# Patient Record
Sex: FEMALE | Race: WHITE | NOT HISPANIC OR LATINO | Employment: STUDENT | URBAN - METROPOLITAN AREA
[De-identification: names, ages, dates, MRNs, and addresses within clinical notes are randomized per-mention and may not be internally consistent; named-entity substitution may affect disease eponyms.]

---
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
## 2021-02-01 ENCOUNTER — OFFICE VISIT (OUTPATIENT)
Dept: ORTHOPEDICS | Facility: CLINIC | Age: 21
End: 2021-02-01
Payer: COMMERCIAL

## 2021-02-01 DIAGNOSIS — M67.431 GANGLION, RIGHT WRIST: Primary | ICD-10-CM

## 2021-02-01 DIAGNOSIS — Z01.818 PREOP TESTING: Primary | ICD-10-CM

## 2021-02-01 PROCEDURE — 99203 OFFICE O/P NEW LOW 30 MIN: CPT | Mod: S$GLB,,, | Performed by: ORTHOPAEDIC SURGERY

## 2021-02-01 PROCEDURE — 99203 PR OFFICE/OUTPT VISIT, NEW, LEVL III, 30-44 MIN: ICD-10-PCS | Mod: S$GLB,,, | Performed by: ORTHOPAEDIC SURGERY

## 2021-02-01 PROCEDURE — 1125F AMNT PAIN NOTED PAIN PRSNT: CPT | Mod: S$GLB,,, | Performed by: ORTHOPAEDIC SURGERY

## 2021-02-01 PROCEDURE — 99999 PR PBB SHADOW E&M-EST. PATIENT-LVL III: ICD-10-PCS | Mod: PBBFAC,,, | Performed by: ORTHOPAEDIC SURGERY

## 2021-02-01 PROCEDURE — 99999 PR PBB SHADOW E&M-EST. PATIENT-LVL III: CPT | Mod: PBBFAC,,, | Performed by: ORTHOPAEDIC SURGERY

## 2021-02-01 PROCEDURE — 1125F PR PAIN SEVERITY QUANTIFIED, PAIN PRESENT: ICD-10-PCS | Mod: S$GLB,,, | Performed by: ORTHOPAEDIC SURGERY

## 2021-02-01 RX ORDER — LIDOCAINE HYDROCHLORIDE 10 MG/ML
1 INJECTION, SOLUTION EPIDURAL; INFILTRATION; INTRACAUDAL; PERINEURAL ONCE
Status: CANCELLED | OUTPATIENT
Start: 2021-02-01 | End: 2021-02-01

## 2021-02-05 ENCOUNTER — TELEPHONE (OUTPATIENT)
Dept: ORTHOPEDICS | Facility: CLINIC | Age: 21
End: 2021-02-05

## 2021-02-08 ENCOUNTER — TELEPHONE (OUTPATIENT)
Dept: ORTHOPEDICS | Facility: CLINIC | Age: 21
End: 2021-02-08

## 2022-11-22 ENCOUNTER — OFFICE VISIT (OUTPATIENT)
Dept: INFECTIOUS DISEASES | Facility: CLINIC | Age: 22
End: 2022-11-22
Payer: COMMERCIAL

## 2022-11-22 ENCOUNTER — CLINICAL SUPPORT (OUTPATIENT)
Dept: INFECTIOUS DISEASES | Facility: CLINIC | Age: 22
End: 2022-11-22
Payer: COMMERCIAL

## 2022-11-22 VITALS
DIASTOLIC BLOOD PRESSURE: 71 MMHG | SYSTOLIC BLOOD PRESSURE: 110 MMHG | TEMPERATURE: 99 F | HEIGHT: 63 IN | BODY MASS INDEX: 36.57 KG/M2 | HEART RATE: 75 BPM | WEIGHT: 206.38 LBS

## 2022-11-22 DIAGNOSIS — Z71.84 TRAVEL ADVICE ENCOUNTER: Primary | ICD-10-CM

## 2022-11-22 PROCEDURE — 90717 YELLOW FEVER VACCINE SUBQ: CPT | Mod: S$GLB,,, | Performed by: INTERNAL MEDICINE

## 2022-11-22 PROCEDURE — 3078F PR MOST RECENT DIASTOLIC BLOOD PRESSURE < 80 MM HG: ICD-10-PCS | Mod: CPTII,S$GLB,, | Performed by: PHYSICIAN ASSISTANT

## 2022-11-22 PROCEDURE — 99999 PR PBB SHADOW E&M-EST. PATIENT-LVL III: CPT | Mod: PBBFAC,,, | Performed by: PHYSICIAN ASSISTANT

## 2022-11-22 PROCEDURE — 99402 PREV MED CNSL INDIV APPRX 30: CPT | Mod: S$GLB,,, | Performed by: PHYSICIAN ASSISTANT

## 2022-11-22 PROCEDURE — 3008F BODY MASS INDEX DOCD: CPT | Mod: CPTII,S$GLB,, | Performed by: PHYSICIAN ASSISTANT

## 2022-11-22 PROCEDURE — 90471 IMMUNIZATION ADMIN: CPT | Mod: S$GLB,,, | Performed by: INTERNAL MEDICINE

## 2022-11-22 PROCEDURE — 3008F PR BODY MASS INDEX (BMI) DOCUMENTED: ICD-10-PCS | Mod: CPTII,S$GLB,, | Performed by: PHYSICIAN ASSISTANT

## 2022-11-22 PROCEDURE — 99999 PR PBB SHADOW E&M-EST. PATIENT-LVL I: CPT | Mod: PBBFAC,,,

## 2022-11-22 PROCEDURE — 99999 PR PBB SHADOW E&M-EST. PATIENT-LVL III: ICD-10-PCS | Mod: PBBFAC,,, | Performed by: PHYSICIAN ASSISTANT

## 2022-11-22 PROCEDURE — 99402 PR PREVENT COUNSEL,INDIV,30 MIN: ICD-10-PCS | Mod: S$GLB,,, | Performed by: PHYSICIAN ASSISTANT

## 2022-11-22 PROCEDURE — 3074F PR MOST RECENT SYSTOLIC BLOOD PRESSURE < 130 MM HG: ICD-10-PCS | Mod: CPTII,S$GLB,, | Performed by: PHYSICIAN ASSISTANT

## 2022-11-22 PROCEDURE — 90471 YELLOW FEVER VACCINE SQ: ICD-10-PCS | Mod: S$GLB,,, | Performed by: INTERNAL MEDICINE

## 2022-11-22 PROCEDURE — 99999 PR PBB SHADOW E&M-EST. PATIENT-LVL I: ICD-10-PCS | Mod: PBBFAC,,,

## 2022-11-22 PROCEDURE — 3078F DIAST BP <80 MM HG: CPT | Mod: CPTII,S$GLB,, | Performed by: PHYSICIAN ASSISTANT

## 2022-11-22 PROCEDURE — 3074F SYST BP LT 130 MM HG: CPT | Mod: CPTII,S$GLB,, | Performed by: PHYSICIAN ASSISTANT

## 2022-11-22 PROCEDURE — 90717 YELLOW FEVER VACCINE SQ: ICD-10-PCS | Mod: S$GLB,,, | Performed by: INTERNAL MEDICINE

## 2022-11-22 RX ORDER — AZITHROMYCIN 500 MG/1
500 TABLET, FILM COATED ORAL DAILY
Qty: 2 TABLET | Refills: 0 | Status: SHIPPED | OUTPATIENT
Start: 2022-11-22

## 2022-11-22 RX ORDER — ATOVAQUONE AND PROGUANIL HYDROCHLORIDE 250; 100 MG/1; MG/1
1 TABLET, FILM COATED ORAL DAILY
Qty: 18 TABLET | Refills: 0 | Status: SHIPPED | OUTPATIENT
Start: 2022-11-22 | End: 2022-12-10

## 2022-11-22 NOTE — PROGRESS NOTES
Subjective:      Chief Complaint:   Chief Complaint   Patient presents with    Travel Consult     History of Present Illness    Patient  22 y.o. female who presents today for routine pretravel consultation.  The patient reports a past medical history of none.  The patient reports the following medication allergies; none.  The patient reports the following food allergies; none .  The patient will be traveling to  [facility] on 3/2/22.  The patient will be at this destination for 9 days.  The patient will be lodging at a hotel.  The has travelled to the following other countries in the past; [facility], .  The patient reports that they up to date all their childhood vaccinations.  The patient reports receipt of the following travel related vaccinations; none.  The purpose of this trip is medical mission trip.        Immunization History   Administered Date(s) Administered    COVID-19, MRNA, LN-S, PF (Pfizer) (Purple Cap) 09/17/2021, 10/09/2021    DTaP 1998, 1998, 1998, 11/02/1999, 05/21/2002    HIB 08/03/1999    HPV Quadrivalent 03/09/2015, 05/25/2015    Hepatitis B, Adult 01/25/2022, 04/13/2022    Hepatitis B, Pediatric/Adolescent 1998, 1998, 1998    Hib-HbOC 1998, 1998, 1998    IPV 1998, 1998, 06/04/1999, 05/21/2002    Influenza - Quadrivalent - PF *Preferred* (6 months and older) 11/16/2022    MMR 08/03/1999, 05/21/2002    Meningococcal Conjugate (MCV4P) 08/10/2009, 07/11/2016    Td - PF (ADULT) 04/13/2022    Tdap 08/10/2009    Varicella 06/04/1999, 08/10/2009    Yellow Fever 11/22/2022       Assessment:     Pre-Travel clinic assessment    Plan:   Patient specific risks:         The Patient was provided with an extensive travel guidance packet which provides travel information specific to the patients itinerary.    The patient's immunization history was reviewed and, based on the patient's itinerary, immunizations were ordered.     -Infectious Disease  risks:       Mosquito Borne pathogens:  Reviewed basic mosquito avoidance precautions including wearing long sleeve clothing and insect repellant. The patient was instructed to purchase insect repellent containing DEET or [place] and apply according to repellent label instructions. Malarone (18# tablets) was prescribed for malaria prophylaxis and possible side effects were reviewed.      Food Borne pathogens:  Reviewed basic hand, food and water sanitation precautions.  Patient instructed to take hand  on their trip. Deferred  Hepatitis A today. Deferred typhoid today . Azithromycin as needed for severe diarrhea? The patient was instructed to purchase Imodium over the counter to take in case diarrhea (without blood or fever) develops.      Blood Borne Pathogens: Patient has/has not received the hepatitis b vaccination series.     STDs:  Risk of STDs reviewed with the patient.  Up to date with HEP B      Routine:   Up to date on Tdap/Influenza vaccine    Environmental risks:   The patient was counseled on:  Precautions to minimize risk/exposure to crime and motor vehicle accidents  Precautions against development of DVT during flight  Precautions to prevent exposure to rabies and seek treatment for possible exposures  Precautions against sun exposure  Personal and travel safety    The patient was instructed to contact us if problems develop after travel.    Yellow fever vaccine today  UTD with meningococcal vaccine  Rx given for malaria ppx and travelers diarrhea    All questions answered. Contact information provided if wishes to receive other vaccines mentioned above.

## 2022-11-22 NOTE — PROGRESS NOTES
Patient received yellow fever vaccine sub Q to the right deltoid w/yellow card documentation.  Tolerated well and left in NAD

## 2024-09-05 ENCOUNTER — TELEPHONE (OUTPATIENT)
Dept: UROGYNECOLOGY | Facility: CLINIC | Age: 24
End: 2024-09-05

## 2024-09-05 NOTE — TELEPHONE ENCOUNTER
----- Message from Nora Trimble sent at 9/5/2024 11:51 AM CDT -----  Type:  Patient Returning Call    Who Called: the patient  Who Left Message for Patient:  Does the patient know what this is regarding?: appt  Would the patient rather a call back or a response via [facility]? call back   Best Call Back Number: [phone]   Additional Information: Thanks

## 2024-09-05 NOTE — TELEPHONE ENCOUNTER
States she has IC , and had seen someone in  [place]. Patient states that her mom is a nurse and was told by Dr Barney to call and schedule an appointment. Please advise if okay to schedule or not?

## 2024-10-14 ENCOUNTER — OFFICE VISIT (OUTPATIENT)
Dept: UROGYNECOLOGY | Facility: CLINIC | Age: 24
End: 2024-10-14
Payer: COMMERCIAL

## 2024-10-14 DIAGNOSIS — R35.0 URINARY FREQUENCY: Primary | ICD-10-CM

## 2024-10-14 DIAGNOSIS — R39.89 CYSTALGIA: ICD-10-CM

## 2024-10-14 LAB
BILIRUBIN, UA POC OHS: NEGATIVE
BLOOD, UA POC OHS: NEGATIVE
CLARITY, UA POC OHS: CLEAR
COLOR, UA POC OHS: YELLOW
GLUCOSE, UA POC OHS: NEGATIVE
KETONES, UA POC OHS: NEGATIVE
LEUKOCYTES, UA POC OHS: ABNORMAL
NITRITE, UA POC OHS: NEGATIVE
PH, UA POC OHS: 7
PROTEIN, UA POC OHS: NEGATIVE
SPECIFIC GRAVITY, UA POC OHS: 1.01
UROBILINOGEN, UA POC OHS: 0.2

## 2024-10-14 PROCEDURE — 1159F MED LIST DOCD IN RCRD: CPT | Mod: CPTII,S$GLB,, | Performed by: OBSTETRICS & GYNECOLOGY

## 2024-10-14 PROCEDURE — 99204 OFFICE O/P NEW MOD 45 MIN: CPT | Mod: S$GLB,,, | Performed by: OBSTETRICS & GYNECOLOGY

## 2024-10-14 PROCEDURE — 99999 PR PBB SHADOW E&M-EST. PATIENT-LVL II: CPT | Mod: PBBFAC,,, | Performed by: OBSTETRICS & GYNECOLOGY

## 2024-10-14 PROCEDURE — 81003 URINALYSIS AUTO W/O SCOPE: CPT | Mod: QW,S$GLB,, | Performed by: OBSTETRICS & GYNECOLOGY

## 2024-10-14 RX ORDER — AMITRIPTYLINE HYDROCHLORIDE 25 MG/1
25 TABLET, FILM COATED ORAL NIGHTLY
Qty: 90 TABLET | Refills: 3 | Status: SHIPPED | OUTPATIENT
Start: 2024-10-14 | End: 2025-10-14

## 2024-10-14 NOTE — PROGRESS NOTES
Subjective:     Chief Complaint:  Interstitial cystitis  History of Present Illness: Inna Norris is a 24 y.o. female who presents for interstitial cystitis.  She was diagnosed in [facility] after having history of what she thought was recurrent UTIs.  She was placed Myrbetriq and hydroxyzine.  She was doing very well and was not sure if she still needed but when she ran out of the medication started have recurrent symptoms including bloating spasms food sensitivity and dyspareunia.  She questioned whether there was a gyn component as she has had procedure such as colposcopy for RAE 1    Review of Systems    Constitutional: No acute distress. No weight gain/loss.  Eyes: No recent vision changes.  ENT: No frequent headaches.   Respiratory: No SOB.  Cardiovascular:  No significant history  Gastrointestinal: No constipation. No diarrhea. No fecal incontinence.   Genitourinary: No vaginal bleeding or discharge.  Integument/Breast: Negative  Hematologic/Lymphatic: No history of anemia.  Musculoskeletal: No major back pain. No abdominal pain.  Neurological: No known disc problems. No paresthesias.  Behavioral/Psych: No history of depression.   Endocrine: No hormonal replacement.  Allergy/Immune: no recent reactions     Objective:   General Exam:  General appearance: WDNF. NAD.   HEENT: Chyna. EOM's intact.  Neck: Normal thyroid.   Back: No CVA tenderness.  RESP: No SOB.  Breasts: deferred  Abdomen: Benign without localizing signs.  Extremities: No edema. No varices.  Lymphatic: noncontributory  Skin: No rashes. No lesions.  Neurologic: Intact.   Psych: Oriented.   Pelvic Exam:  V:  No lesions. No palpable nodes.  No evidence of vestibulitis  Va:No d/c bleeding or lesions.  Good support  .Meatus:No caruncle or stenosis.  No discharge  Urethra: Non tender. No suburethral masses.  Cx/Cuff:  No cervical motion tenderness  Uterus:  Nontender with no palpable abnormalities  Ad: No mass or tenderness.  Levators  :Symmetrical. Normal tone. Non tender.  BL: tender and she reports this is the location of her pain and discomfort  RV: No hemorrhoids.  Assessment:   Pain does appear to be of bladder origin with no indication vestibulitis, pelvic floor dysfunction or cervical and uterine etiologies  Has responded in the past to Myrbetriq (expensive on her insurance) and hydroxyzine       Plan:  Will give a trial of bedtime amitriptyline; if effective, we will recheck for improvement in bladder tenderness in 3-4 months and consider continuing medication for a year before discontinuing.  If she does not get a good response and/or symptoms worsen, will treat more aggressively

## 2024-11-13 ENCOUNTER — TELEPHONE (OUTPATIENT)
Dept: UROGYNECOLOGY | Facility: CLINIC | Age: 24
End: 2024-11-13
Payer: COMMERCIAL

## 2024-11-13 NOTE — TELEPHONE ENCOUNTER
----- Message from Tracy sent at 11/13/2024  2:58 PM CST -----  Name of Who is Calling:MARJORIE GHOSH [[id number]]        What is the request in detail: She having side effects from   amitriptyline (ELAVIL)           Can the clinic reply by [facility]: No        What Number to Call Back if not in [facility]:Telephone Information:  Mobile          [phone]

## 2024-11-13 NOTE — TELEPHONE ENCOUNTER
States that she took the Medication ( Amitriptyline) at night but she is drowsy the next day and sleepy, has been on it for two weeks feels like she is hung over the next day. 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 Also was thinking that she would be working  in the next year 7 days then 7 nights and does not know if there was something else she can try?

## 2024-11-19 RX ORDER — HYDROXYZINE HYDROCHLORIDE 25 MG/1
25 TABLET, FILM COATED ORAL NIGHTLY
Qty: 60 TABLET | Refills: 3 | Status: SHIPPED | OUTPATIENT
Start: 2024-11-19

## 2024-11-19 RX ORDER — MIRABEGRON 50 MG/1
1 TABLET, FILM COATED, EXTENDED RELEASE ORAL DAILY
Qty: 30 TABLET | Refills: 11 | Status: SHIPPED | OUTPATIENT
Start: 2024-11-19 | End: 2025-11-19

## 2024-11-19 NOTE — TELEPHONE ENCOUNTER
"Called and informed "Since she did well in [place] on a combination of Myrbetriq and hydroxyzine, I sent in prescription for those and want to get a baseline on how well she does with those before we make any further changes.  Have her stay on it for 6-8 weeks then follow-up and will go from there " per DR Barney.           "

## 2025-01-17 ENCOUNTER — TELEPHONE (OUTPATIENT)
Dept: UROGYNECOLOGY | Facility: CLINIC | Age: 25
End: 2025-01-17
Payer: COMMERCIAL

## 2025-01-17 RX ORDER — TOLTERODINE 4 MG/1
4 CAPSULE, EXTENDED RELEASE ORAL DAILY
Qty: 30 CAPSULE | Refills: 11 | Status: SHIPPED | OUTPATIENT
Start: 2025-01-17

## 2025-01-17 NOTE — TELEPHONE ENCOUNTER
Called and informed elizabeth of new script. States she jut filled it , informed that she had to try other med before they would approve it , will check with pharmacy

## 2025-01-17 NOTE — TELEPHONE ENCOUNTER
----- Message from Jos Barney MD sent at 1/17/2025 10:44 AM CST -----  Regarding: FW: Myrbetriq denied by insurance  Re Inna Norris; I send in the prescription for tolterodine  ----- Message -----  From: Joanna Warner LPN  Sent: 1/16/2025   4:35 PM CST  To: Jos Barney MD  Subject: Myrbetriq denied by insurance                    Needs to try Oxybutynin, Trospium or tolterodine  first.

## 2025-01-20 ENCOUNTER — PATIENT MESSAGE (OUTPATIENT)
Dept: UROLOGY | Facility: CLINIC | Age: 25
End: 2025-01-20
Payer: COMMERCIAL

## 2025-02-03 ENCOUNTER — OFFICE VISIT (OUTPATIENT)
Dept: UROGYNECOLOGY | Facility: CLINIC | Age: 25
End: 2025-02-03
Payer: COMMERCIAL

## 2025-02-03 DIAGNOSIS — R35.0 URINARY FREQUENCY: Primary | ICD-10-CM

## 2025-02-03 LAB
BILIRUBIN, UA POC OHS: NEGATIVE
BLOOD, UA POC OHS: NEGATIVE
CLARITY, UA POC OHS: CLEAR
COLOR, UA POC OHS: YELLOW
GLUCOSE, UA POC OHS: NEGATIVE
KETONES, UA POC OHS: NEGATIVE
LEUKOCYTES, UA POC OHS: NEGATIVE
NITRITE, UA POC OHS: NEGATIVE
PH, UA POC OHS: 8
PROTEIN, UA POC OHS: NEGATIVE
SPECIFIC GRAVITY, UA POC OHS: 1.01
UROBILINOGEN, UA POC OHS: 0.2

## 2025-02-03 PROCEDURE — 99214 OFFICE O/P EST MOD 30 MIN: CPT | Mod: S$GLB,,, | Performed by: OBSTETRICS & GYNECOLOGY

## 2025-02-03 PROCEDURE — 81003 URINALYSIS AUTO W/O SCOPE: CPT | Mod: QW,S$GLB,, | Performed by: OBSTETRICS & GYNECOLOGY

## 2025-02-03 PROCEDURE — 99999 PR PBB SHADOW E&M-EST. PATIENT-LVL II: CPT | Mod: PBBFAC,,, | Performed by: OBSTETRICS & GYNECOLOGY

## 2025-02-03 PROCEDURE — 1159F MED LIST DOCD IN RCRD: CPT | Mod: CPTII,S$GLB,, | Performed by: OBSTETRICS & GYNECOLOGY

## 2025-02-03 NOTE — PROGRESS NOTES
Subjective:     Chief Complaint:  IC  History of Present Illness: Inna Norris is a 24 y.o. female who presents for IC.  When she was in [place] she did well with the combination of Myrbetriq and hydroxyzine.  She eventually went on just Myrbetriq and did well but her insurance does not want to cover it anymore.  She tried tolterodine and says it does well but not quite as well as the Myrbetriq.  She tried taking amitriptyline but found that it had too much sedation.  She has a new job coming up and will have new insurance so her coverage will change      Review of Systems    Constitutional: No acute distress. No weight gain/loss.  Eyes: No vision changes.  ENT: No headaches.   Respiratory:  Nonsmoker  Cardiovascular:  Unremarkable  Gastrointestinal:  Some food intolerance   Genitourinary: No vaginal bleeding or discharge.  Integument/Breast: Negative  Hematologic/Lymphatic: No history of anemia.  Musculoskeletal:  Ganglion  Neurological: No known disc problems. No paresthesias.  Behavioral/Psych: No history of depression.   Endocrine: No hormonal replacement.  Allergy/Immune: no recent reactions     Objective:   General Exam:  General appearance: WDNF. NAD.   HEENT: Chyna. EOM's intact.  Neck: Normal thyroid.   Back: No CVA tenderness.  RESP: No SOB.  Breasts: deferred  Abdomen: Benign without localizing signs.  Extremities: No edema. No varices.  Lymphatic: noncontributory  Skin: No rashes. No lesions.  Neurologic: Intact.   Psych: Oriented.       Assessment:   Did very well with Myrbetriq but not covered by insurance.  Does okay with tolterodine.  Inconsistent response to foods but she said acidic foods tend to be the worse for her       Plan:    Will compare tolterodine with Gemtesa.  When she is on her new insurance will see whether they cover Myrbetriq or Gemtesa adequately or she has to stay on why the anticholinergics  Will check her pH to see if her symptoms correlate and her tolerance to foods or  affected by the pH

## 2025-02-04 ENCOUNTER — PATIENT MESSAGE (OUTPATIENT)
Dept: UROGYNECOLOGY | Facility: CLINIC | Age: 25
End: 2025-02-04
Payer: COMMERCIAL

## 2025-02-04 RX ORDER — ONDANSETRON 4 MG/1
4 TABLET, ORALLY DISINTEGRATING ORAL EVERY 8 HOURS PRN
Qty: 15 TABLET | Refills: 2 | Status: SHIPPED | OUTPATIENT
Start: 2025-02-04

## 2025-03-06 ENCOUNTER — TELEPHONE (OUTPATIENT)
Dept: UROGYNECOLOGY | Facility: CLINIC | Age: 25
End: 2025-03-06
Payer: COMMERCIAL

## 2025-03-06 NOTE — TELEPHONE ENCOUNTER
Dr Barney had given her samples of the gemtesa and would like to get a script for it to send to  [facility]

## 2025-03-06 NOTE — TELEPHONE ENCOUNTER
----- Message from Luis Alberto sent at 3/6/2025  3:34 PM CST -----  Regarding: Refill  Type:  RX Refill RequestWho Called: pt Refill or New Rx: new RX Name and Strength:unk How is the patient currently taking it? (ex. 1XDay):as directed Is this a 30 day or 90 day RX:30Preferred Pharmacy with phone number:[facility] [id number] [facility] [address] [address]: [phone] Fax: [phone] or Mail Order:local Ordering Provider:Ector Would the patient rather a call back or a response via [facility]? Call back Best Call Back Number:[phone] Additional Information: pt took home samples and needs to discuss which samples worked and then allow  to make a choice , please call to advise, Thank You.

## 2025-03-07 ENCOUNTER — PATIENT MESSAGE (OUTPATIENT)
Dept: UROGYNECOLOGY | Facility: CLINIC | Age: 25
End: 2025-03-07
Payer: COMMERCIAL